# Patient Record
Sex: MALE | Race: WHITE | NOT HISPANIC OR LATINO | Employment: UNEMPLOYED | URBAN - METROPOLITAN AREA
[De-identification: names, ages, dates, MRNs, and addresses within clinical notes are randomized per-mention and may not be internally consistent; named-entity substitution may affect disease eponyms.]

---
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
## 2024-04-18 ENCOUNTER — OFFICE VISIT (OUTPATIENT)
Dept: URGENT CARE | Age: 19
End: 2024-04-18
Payer: COMMERCIAL

## 2024-04-18 VITALS
HEART RATE: 72 BPM | DIASTOLIC BLOOD PRESSURE: 82 MMHG | OXYGEN SATURATION: 99 % | RESPIRATION RATE: 20 BRPM | SYSTOLIC BLOOD PRESSURE: 122 MMHG | TEMPERATURE: 98.7 F

## 2024-04-18 DIAGNOSIS — L98.9 SKIN LESION OF NECK: Primary | ICD-10-CM

## 2024-04-18 PROCEDURE — G0382 LEV 3 HOSP TYPE B ED VISIT: HCPCS | Performed by: STUDENT IN AN ORGANIZED HEALTH CARE EDUCATION/TRAINING PROGRAM

## 2024-04-18 PROCEDURE — S9083 URGENT CARE CENTER GLOBAL: HCPCS | Performed by: STUDENT IN AN ORGANIZED HEALTH CARE EDUCATION/TRAINING PROGRAM

## 2024-04-18 NOTE — PROGRESS NOTES
[facility]        NAME: Alphonse Parisi is a 18 y.o. male  : 2005    MRN: [id number]  DATE: 2024  TIME: 6:10 PM    Assessment and Plan   Skin lesion of neck [L98.9]  1. Skin lesion of neck              Patient Instructions   The bump on your beauty huber looks like it may be a small blood blister could have happened during boxing.  I recommend keeping an eye on it, it should gradually shrink over time.  If it is not going away and is instead growing or changing in the coming weeks or months, please follow-up with a PCP.  I am giving you number today to call to get yourself established with a PCP.  I recommend calling tomorrow for a visit to establish care.  It can take some time for the first visit, so is better to schedule early rather than waiting until you have a problem.    Chief Complaint     Chief Complaint   Patient presents with    birth huber     Patient noticed a black spot on his birth huber yesterday.          History of Present Illness       Patient presents with his mom for evaluation of a change in a video huber on the left side of his neck.  He had a light-colored huber for many years, over the last 1 day, he noticed that there is a portion of this that was darker in color.  He is otherwise in his usual state of health, it is not painful.  No known insect bites.  He is active with boxing, could have been struck in that area.        Review of Systems   Review of Systems   All other systems reviewed and are negative.        Current Medications     No current outpatient medications on file.    Current Allergies     Allergies as of 2024    (No Known Allergies)            The following portions of the patient's history were reviewed and updated as appropriate: allergies, current medications, past family history, past medical history, past social history, past surgical history and problem list.     No past medical history on file.    No past surgical history on file.    No family  history on file.      Medications have been verified.        Objective   /82   Pulse 72   Temp 98.7 °F (37.1 °C)   Resp 20   SpO2 99%   No LMP for male patient.       Physical Exam     Physical Exam  Vitals and nursing note reviewed.   Constitutional:       Appearance: He is not ill-appearing, toxic-appearing or diaphoretic.   HENT:      Head: Normocephalic and atraumatic.      Right Ear: External ear normal.      Left Ear: External ear normal.      Nose: Nose normal.   Eyes:      Extraocular Movements: Extraocular movements intact.      Conjunctiva/sclera: Conjunctivae normal.   Skin:     General: Skin is warm and dry.      Findings: No rash.      Comments: Light-colored nevi 5mm to lateral left neck with circular well-circumscribed darker area that appears to be a very small hematoma darkened areas about 2 mm diameter  No open wound   Neurological:      Mental Status: He is alert.   Psychiatric:         Mood and Affect: Mood normal.

## 2024-04-18 NOTE — PATIENT INSTRUCTIONS
The bump on your beauty huber looks like it may be a small blood blister could have happened during boxing.  I recommend keeping an eye on it, it should gradually shrink over time.  If it is not going away and is instead growing or changing in the coming weeks or months, please follow-up with a PCP.  I am giving you number today to call to get yourself established with a PCP.  I recommend calling tomorrow for a visit to establish care.  It can take some time for the first visit, so is better to schedule early rather than waiting until you have a problem.